# Patient Record
Sex: FEMALE | Race: WHITE
[De-identification: names, ages, dates, MRNs, and addresses within clinical notes are randomized per-mention and may not be internally consistent; named-entity substitution may affect disease eponyms.]

---

## 2019-08-09 ENCOUNTER — HOSPITAL ENCOUNTER (EMERGENCY)
Dept: HOSPITAL 74 - FER | Age: 24
LOS: 1 days | Discharge: HOME | End: 2019-08-10
Payer: COMMERCIAL

## 2019-08-09 VITALS — BODY MASS INDEX: 32.8 KG/M2

## 2019-08-09 VITALS — TEMPERATURE: 97.8 F | HEART RATE: 80 BPM | DIASTOLIC BLOOD PRESSURE: 77 MMHG | SYSTOLIC BLOOD PRESSURE: 113 MMHG

## 2019-08-09 DIAGNOSIS — K29.01: Primary | ICD-10-CM

## 2019-08-09 PROCEDURE — 3E033GC INTRODUCTION OF OTHER THERAPEUTIC SUBSTANCE INTO PERIPHERAL VEIN, PERCUTANEOUS APPROACH: ICD-10-PCS

## 2019-08-09 PROCEDURE — 3E0337Z INTRODUCTION OF ELECTROLYTIC AND WATER BALANCE SUBSTANCE INTO PERIPHERAL VEIN, PERCUTANEOUS APPROACH: ICD-10-PCS

## 2019-08-09 NOTE — PDOC
History of Present Illness





- General


Chief Complaint: Pain, Acute


Stated Complaint: ABD PAIN


Time Seen by Provider: 08/09/19 23:36





- History of Present Illness


Initial Comments: 





08/09/19 23:59


This 23 y.o. woman with a history of kidney stones and irritable bowel syndrome 

presents with episode of vomiting followed by hematemesis just prior to 

presentation.  She relates feeling "gassy" with several episodes of diarrhea 

throughout the day.  However, she had no nausea and was able to eat/drink 

without difficulty.  Tonight, she ate chicken wings with french fries (take out

) and vomited almost immediately after finishing her meal.  Vomitus was semi-

digested food , followed by liquid blood(1/2 cup estimate) and patient 

presented to ER immediately.  It is unclear whether there were any clots 

present with the liquid blood in emesiis.  No previous history of hematemesis/

peptic ulcer disease/gastritis.  She relates increase in heartburn episodes 

over the last few months.  The patient states that she was drinking beer with 

her meal tonight but otherwise does not drink alcohol daily basis.  She had 

taken either Excedrin or aspirin earlier today for headache but does not take 

daily NSAIDs or aspirin.  She has history of bleeding with bowel movements and 

has had colonoscopy which was reportedly normal.  She states that origin of her 

hematochezia is thought to be hemorrhoids or rectal fissure.





No known sick contacts (patient does work in healthcare facility,[employer])


No recent travel





No daily medications


No known ALLERGIES


 


Smokes "occasionally"


Social alcohol use/no other recreational drug use








Past History





- Past Medical History


Allergies/Adverse Reactions: 


 Allergies











Allergy/AdvReac Type Severity Reaction Status Date / Time


 


No Known Allergies Allergy   Verified 05/31/14 15:53











Home Medications: 


Ambulatory Orders





Ondansetron [Zofran Odt -] 4 mg SL TID PRN #12 od.tablet 08/10/19 


Pantoprazole Sodium [Protonix -] 40 mg PO ONCE #14 tablet.ec 08/10/19 








Anemia: No


Asthma: No


Cancer: No


Cardiac Disorders: No


CVA: No


COPD: No


CHF: No


Dementia: No


Diabetes: No


GI Disorders: No


 Disorders: No


HTN: No


Hypercholesterolemia: No


Kidney Stones: Yes


Liver Disease: No


Seizures: No


Thyroid Disease: No





- Surgical History


Abdominal Surgery: No


Appendectomy: No


Cardiac Surgery: No


Cholecystectomy: No


Lung Surgery: No


Neurologic Surgery: No


Orthopedic Surgery: No





- Reproductive History


Cervical CA: No


Dysfunctional Uterine Bleeding: No


Ectopic Pregnancy: No


Endometrial CA: No


Polycystic Ovaries: No


Tubal Ligation: No





- Immunization History


Immunization Up to Date: Yes





- Suicide/Smoking/Psychosocial Hx


Smoking Status: No


Smoking History: Never smoked


Have you smoked in the past 12 months: No


Number of Cigarettes Smoked Daily: 0


Hx Alcohol Use: No


Drug/Substance Use Hx: No


Substance Use Type: None


Hx Substance Use Treatment: No





**Review of Systems





- Review of Systems


Able to Perform ROS?: Yes


Comments:: 





12 point review of systems is negative except for what is noted in the history 

of present illness








*Physical Exam





- Physical Exam


Comments: 





GENERAL: Adult female, alert and oriented 3, no acute distress


HEAD: Normal with no signs of trauma.


EYES: PERRLA, EOMI, sclera anicteric, conjunctiva clear.


ENT: Ears normal, nares patent, oropharynx clear without exudates.  Dry mucous 

membranes.


NECK: Normal range of motion, supple without lymphadenopathy, JVD, or masses.


LUNGS: Breath sounds equal, clear to auscultation bilaterally.  No wheezes, and 

no crackles.


HEART:Regular rate and rhythm, normal S1 and S2 without murmur, rub or gallop.


ABDOMEN:.normal bowel sounds  No guarding,tenderness or rebound.No masses No 

distention. 


EXTREMITIES: Normal range of motion, no edema.  No clubbing or cyanosis. No 

erythema, or tenderness.


NEUROLOGICAL: Cranial nerves II through XII grossly intact.  Normal speech.  No 

focal 


neurological deficits. 


MUSCULOSKELETAL:  Back non-tender to palpation, no CVA tenderness


SKIN: Warm, Dry, normal turgor, no rashes or lesions noted.








Progress Note





- Progress Note


Progress Note: 





The patient reports significant relief in her symptoms (epigastric discomfort) 

after 1 L of normal saline IV, 4 mg of Zofran IV, Protonix 40 mg IV.





Clinical presentation most consistent with acute gastritis; she may also have 

some element of gastroenteritis, triggering hyperacidity and gastritis.  

Patient will be advised to continue clear liquids diet with advancement to 

light diet cautiously.  She should continue Protonix 40 mg daily until seen by 

her PMD.  Also, prescription for Zofran ODT 4 mg to be used up to 3 times a day 

as needed for nausea will be sent to her pharmacy.


She should return to the ER if she has severe pain/persistent vomiting or fever.


She states that she prefers to see her general medical doctor and receive 

referral to gastroenterologist from her.  She should call her PMD's office on 

the next business today and arrange follow-up within the next 48 hours.





*DC/Admit/Observation/Transfer


Diagnosis at time of Disposition: 


Gastritis


Qualifiers:


 Gastritis type: unspecified gastritis Chronicity: acute Gastritis bleeding: 

with bleeding Qualified Code(s): K29.01 - Acute gastritis with bleeding








- Discharge Dispostion


Disposition: HOME


Condition at time of disposition: Stable





- Prescriptions


Prescriptions: 


Ondansetron [Zofran Odt -] 4 mg SL TID PRN #12 od.tablet


 PRN Reason: Nausea


Pantoprazole Sodium [Protonix -] 40 mg PO ONCE #14 tablet.ec





- Referrals


Referrals: 


Sandee Herrera MD [Primary Care Provider] - 2 Days





- Patient Instructions


Printed Discharge Instructions:  Gastritis


Additional Instructions: 


clear liquids, advance diet cautiously


Protonix 40 mg daily


Zofran ODT4mg up to3 X a day for nausea/vomiting


you can use Pepto-Bismol/Maalox or Mylanta for stomach discomfort


avoid alcohol, smoking, aspirin/ibuprofen/naproxen until seen by your doctor


return to ER if you experience persistent pain, vomiting, bloody stools 


Call Dr Herrera' office on Monday to arrange followup within 2-3 days








- Post Discharge Activity